# Patient Record
Sex: FEMALE | Race: BLACK OR AFRICAN AMERICAN | Employment: FULL TIME | URBAN - METROPOLITAN AREA
[De-identification: names, ages, dates, MRNs, and addresses within clinical notes are randomized per-mention and may not be internally consistent; named-entity substitution may affect disease eponyms.]

---

## 2017-10-23 ENCOUNTER — OFFICE VISIT (OUTPATIENT)
Dept: FAMILY MEDICINE CLINIC | Facility: CLINIC | Age: 47
End: 2017-10-23

## 2017-10-23 VITALS
BODY MASS INDEX: 36.88 KG/M2 | DIASTOLIC BLOOD PRESSURE: 82 MMHG | WEIGHT: 235 LBS | TEMPERATURE: 98 F | HEIGHT: 67 IN | RESPIRATION RATE: 18 BRPM | HEART RATE: 76 BPM | SYSTOLIC BLOOD PRESSURE: 121 MMHG

## 2017-10-23 DIAGNOSIS — Z00.00 ROUTINE PHYSICAL EXAMINATION: Primary | ICD-10-CM

## 2017-10-23 DIAGNOSIS — R10.13 DYSPEPSIA: ICD-10-CM

## 2017-10-23 DIAGNOSIS — N89.8 VAGINAL ODOR: ICD-10-CM

## 2017-10-23 PROCEDURE — 99386 PREV VISIT NEW AGE 40-64: CPT | Performed by: FAMILY MEDICINE

## 2017-10-23 NOTE — PROGRESS NOTES
HPI:    Patient ID: Viki Otero is a 41year old female. Patient is here for routine physical exam. No acute issues. No significant chronic medical problems. Patient is requesting testing. Diet and exercise have been fair.  Past medical history, family physical examination:  - Exam is unremarkable. Screening tests were discussed, and after discussion, will check lab work as below. Healthy diet, exercise, and weight were discussed. To call if problems and follow up and further management after testing.  Jana

## 2017-10-31 ENCOUNTER — TELEPHONE (OUTPATIENT)
Dept: GASTROENTEROLOGY | Facility: CLINIC | Age: 47
End: 2017-10-31

## 2017-10-31 ENCOUNTER — APPOINTMENT (OUTPATIENT)
Dept: LAB | Facility: HOSPITAL | Age: 47
End: 2017-10-31
Attending: FAMILY MEDICINE
Payer: COMMERCIAL

## 2017-10-31 ENCOUNTER — OFFICE VISIT (OUTPATIENT)
Dept: GASTROENTEROLOGY | Facility: CLINIC | Age: 47
End: 2017-10-31

## 2017-10-31 ENCOUNTER — OFFICE VISIT (OUTPATIENT)
Dept: OBGYN CLINIC | Facility: CLINIC | Age: 47
End: 2017-10-31

## 2017-10-31 VITALS
HEART RATE: 70 BPM | HEIGHT: 69 IN | DIASTOLIC BLOOD PRESSURE: 79 MMHG | WEIGHT: 233.38 LBS | BODY MASS INDEX: 34.57 KG/M2 | SYSTOLIC BLOOD PRESSURE: 114 MMHG

## 2017-10-31 VITALS
DIASTOLIC BLOOD PRESSURE: 78 MMHG | SYSTOLIC BLOOD PRESSURE: 115 MMHG | BODY MASS INDEX: 34.66 KG/M2 | WEIGHT: 234 LBS | HEIGHT: 69 IN | HEART RATE: 91 BPM

## 2017-10-31 DIAGNOSIS — Z01.419 ENCOUNTER FOR GYNECOLOGICAL EXAMINATION WITHOUT ABNORMAL FINDING: Primary | ICD-10-CM

## 2017-10-31 DIAGNOSIS — Z12.31 SCREENING MAMMOGRAM, ENCOUNTER FOR: ICD-10-CM

## 2017-10-31 DIAGNOSIS — Z00.00 ROUTINE PHYSICAL EXAMINATION: ICD-10-CM

## 2017-10-31 DIAGNOSIS — R14.0 BLOATING SYMPTOM: ICD-10-CM

## 2017-10-31 DIAGNOSIS — Z12.4 SCREENING FOR MALIGNANT NEOPLASM OF CERVIX: ICD-10-CM

## 2017-10-31 DIAGNOSIS — R10.9 ABDOMINAL DISCOMFORT: Primary | ICD-10-CM

## 2017-10-31 DIAGNOSIS — Z11.3 SCREENING FOR STD (SEXUALLY TRANSMITTED DISEASE): ICD-10-CM

## 2017-10-31 DIAGNOSIS — N89.8 VAGINAL DISCHARGE: ICD-10-CM

## 2017-10-31 PROCEDURE — 87389 HIV-1 AG W/HIV-1&-2 AB AG IA: CPT

## 2017-10-31 PROCEDURE — 99212 OFFICE O/P EST SF 10 MIN: CPT | Performed by: INTERNAL MEDICINE

## 2017-10-31 PROCEDURE — 80074 ACUTE HEPATITIS PANEL: CPT

## 2017-10-31 PROCEDURE — 84443 ASSAY THYROID STIM HORMONE: CPT

## 2017-10-31 PROCEDURE — 36415 COLL VENOUS BLD VENIPUNCTURE: CPT

## 2017-10-31 PROCEDURE — 85027 COMPLETE CBC AUTOMATED: CPT

## 2017-10-31 PROCEDURE — 99243 OFF/OP CNSLTJ NEW/EST LOW 30: CPT | Performed by: INTERNAL MEDICINE

## 2017-10-31 PROCEDURE — 99213 OFFICE O/P EST LOW 20 MIN: CPT | Performed by: OBSTETRICS & GYNECOLOGY

## 2017-10-31 PROCEDURE — 80061 LIPID PANEL: CPT

## 2017-10-31 PROCEDURE — 80053 COMPREHEN METABOLIC PANEL: CPT

## 2017-10-31 PROCEDURE — 99386 PREV VISIT NEW AGE 40-64: CPT | Performed by: OBSTETRICS & GYNECOLOGY

## 2017-10-31 PROCEDURE — 83036 HEMOGLOBIN GLYCOSYLATED A1C: CPT

## 2017-10-31 PROCEDURE — 86780 TREPONEMA PALLIDUM: CPT

## 2017-10-31 RX ORDER — OMEPRAZOLE 40 MG/1
40 CAPSULE, DELAYED RELEASE ORAL DAILY
Qty: 90 CAPSULE | Refills: 1 | Status: SHIPPED | OUTPATIENT
Start: 2017-10-31

## 2017-10-31 NOTE — TELEPHONE ENCOUNTER
Scheduled for:  EGD  Provider Name: Ash Ivory  Date:  11-22-17  Location:  [facility]  Sedation:  IV sedation   Time:  Pt aware CF will call with arrival time the day prior to procedure  Prep: NPO after midnight, clear liquids only up to 3 hours prior  Meds/Allergies

## 2017-10-31 NOTE — H&P
[address] - Gastroenterology                                                                                                  Clinic History and Physical 9\" (1.753 m), weight 233 lb 6.4 oz (105.9 kg), last menstrual period 10/06/2017.     General:awake, cooperative, no acute distress  HEENT: EOMI, no scleral icterus, MMM; oral pharnyx is without exudates or lesions  Neck: no lymphadenopathy; thyroid is not

## 2017-10-31 NOTE — H&P
HPI:  The patient is57year-old female 51-year-old female who presents for well woman examination. She notes recent experiencing foul-smelling vaginal discharge. She denies any antibiotics or changes in soaps or detergents.   She states [facility].  Skin/Breast:  Denies any breast pain, lumps, or discharge. Neurological:  denies headaches, extremity weakness  Psychiatric: denies depression or anxiety.        10/31/17  1401   BP: 115/78   Pulse: 91       PHYSICAL EXAM:   Constitutional: well de HPV HIGH RISK , THIN PREP COLLECTION; Future  -     HPV HIGH RISK , THIN PREP COLLECTION  -     THINPREP PAP SMEAR B        1. WWE:   1. Reviewed ASCCP guidelines with the patient --Pap updated today  2. Contraception: Using condoms  3.  Breast Health:

## 2017-10-31 NOTE — PATIENT INSTRUCTIONS
1. Schedule upper endoscopy (EGD) with Iv/conscious sedation with Dr. Catrachito Manjarrez    2. Take the omeprazole by mouth prescribed to your pharmacy - best if taken 30 minutes prior to eating each morning    3.  Get the X ray of your abdomen completed in radiolog

## 2017-11-02 ENCOUNTER — TELEPHONE (OUTPATIENT)
Dept: PEDIATRICS CLINIC | Facility: CLINIC | Age: 47
End: 2017-11-02

## 2017-11-02 NOTE — TELEPHONE ENCOUNTER
pt calling for results and wants to  a copy at [facility]   pls call once ready

## 2017-11-02 NOTE — TELEPHONE ENCOUNTER
Per the pt she had testing done on Tuesday, and the pt would like the results. Per the pt a detailed v/m can be left at [phone]. Please advise.

## 2017-11-03 RX ORDER — METRONIDAZOLE 500 MG/1
500 TABLET ORAL 2 TIMES DAILY
Qty: 14 TABLET | Refills: 0 | Status: SHIPPED | OUTPATIENT
Start: 2017-11-03 | End: 2017-11-10

## 2017-11-03 NOTE — TELEPHONE ENCOUNTER
Reviewed with LESLIE in office and pt given choice of po Flagyl or Metrogel vaginally. Pt chose Flagyl, 500 mg BID x 7 day ordered. Pt aware.

## 2017-11-03 NOTE — TELEPHONE ENCOUNTER
Pt calling for lab results. All negative except inconclusive BV. Pt reports an odor and would like to be treated. Will review with LESLIE and call her back. Pt verbalized understanding.

## 2017-11-09 NOTE — TELEPHONE ENCOUNTER
C/O SMALL BUMP ON THE OUTSIDE OF THE VAGINA. DOES NOT HURT BUT THE WHOLE VAGINAL AREA ITCHES. DENIES ANY VAG DISCHARGE. STATES PREVIOUS DISCHARGE HAS RESOLVED. PT HAS 1 DAY LEFT OF FLAGYL.   ASKED HER TO MONITOR AND IF SYMPTOMS DO NOT RESOLVE WITHIN 2-3

## 2017-11-22 ENCOUNTER — TELEPHONE (OUTPATIENT)
Dept: GASTROENTEROLOGY | Facility: CLINIC | Age: 47
End: 2017-11-22

## 2017-11-22 DIAGNOSIS — R19.8 ABNORMAL FINDINGS ON ESOPHAGOGASTRODUODENOSCOPY (EGD): Primary | ICD-10-CM

## 2017-11-22 NOTE — TELEPHONE ENCOUNTER
GI staff:    Please also reschedule her for EGD with MAC at [facility] for indication of abdominal pain.     Thank you    Tiana Marie MD  [facility], [facility] - Gastroenterology  11/22/2017  8:18 AM

## 2017-11-22 NOTE — TELEPHONE ENCOUNTER
Dr Simental Situ, please clarify/revise below--your note says UGI WITH KUB, but the order reads UGI NO KUB? Thanks. Will also forward to GI schedulers to reschedule EGD per your orders below.

## 2017-11-22 NOTE — TELEPHONE ENCOUNTER
I attempted to reach pt, but no answer and no voicemail set up. I called her pharmacy also but this is the only number. I will attempt again later.  Just in case, I have also mailed letter, along with both orders and include message about calling to resche

## 2017-11-22 NOTE — TELEPHONE ENCOUNTER
I had ordered a KUB previously for her separately which she had not completed yet. She should have a KUB.  Thanks    Kelsi Sorensen MD  [facility], [facility] - Gastroenterology  11/22/2017  9:07 AM

## 2017-11-22 NOTE — TELEPHONE ENCOUNTER
I called this patient to schedule her procedure which she was unavailable, I could not leave a VM since it has not been set up yet. Please transfer to Arlen Grant in GI if the patient calls back.  If I am not available please inform the patient of her VM and a a

## 2017-11-22 NOTE — TELEPHONE ENCOUNTER
GI staff:    Patient had attempted EGD in [address] today. Unable to intubate the esophagus. Fair tolerating to conscious sedation, but unable to intubate despite multiple attempts. Will order upper GI study to evaluate esophagus.  Please call her today or F

## 2017-11-22 NOTE — TELEPHONE ENCOUNTER
Pt called back in other 11/22 encounter. I reviewed all below with pt and gave OP registration # to call for both x-rays--I read off both of them to her--SHELIA Smith  is gone for the day, but sending this to reschedule pt. Thanks.

## 2017-12-02 ENCOUNTER — HOSPITAL ENCOUNTER (OUTPATIENT)
Dept: GENERAL RADIOLOGY | Facility: HOSPITAL | Age: 47
Discharge: HOME | End: 2017-12-02
Attending: INTERNAL MEDICINE
Payer: COMMERCIAL

## 2017-12-02 DIAGNOSIS — R19.8 ABNORMAL FINDINGS ON ESOPHAGOGASTRODUODENOSCOPY (EGD): ICD-10-CM

## 2017-12-02 PROCEDURE — 74246 X-RAY XM UPR GI TRC 2CNTRST: CPT | Performed by: INTERNAL MEDICINE

## 2017-12-03 ENCOUNTER — TELEPHONE (OUTPATIENT)
Dept: GASTROENTEROLOGY | Facility: CLINIC | Age: 47
End: 2017-12-03

## 2017-12-04 RX ORDER — POLYETHYLENE GLYCOL 3350, SODIUM CHLORIDE, SODIUM BICARBONATE, POTASSIUM CHLORIDE 420; 11.2; 5.72; 1.48 G/4L; G/4L; G/4L; G/4L
POWDER, FOR SOLUTION ORAL
Qty: 1 BOTTLE | Refills: 0 | Status: SHIPPED | OUTPATIENT
Start: 2017-12-04 | End: 2017-12-18

## 2017-12-04 NOTE — TELEPHONE ENCOUNTER
GI Staff:    Please call Ms. Ashley Grayson and let her know    1. Her Xray shows a significant amount of stool, which is related to constipation and likely contributing to her symptoms we discussed in the office last month.     I'd like her to have a washout of the

## 2017-12-04 NOTE — TELEPHONE ENCOUNTER
Pt contacted--given results and all instructions below which pt wrote down and repeated back . Trilyte sent to pharmacy. Maryellen Hill RN faxed instructions to pharmacy. Pt has no further questions and states understanding.

## 2017-12-04 NOTE — TELEPHONE ENCOUNTER
Washout prep instructions faxed to pt preferred pharmacy at [phone]. Pt is already scheduled for EGD on 1/3/2018.  Fax confirmation received 12/4/17 @ 11:25am.

## 2017-12-12 ENCOUNTER — TELEPHONE (OUTPATIENT)
Dept: GASTROENTEROLOGY | Facility: CLINIC | Age: 47
End: 2017-12-12

## 2017-12-12 NOTE — TELEPHONE ENCOUNTER
Call transferred to RN:    Pt called to check what she should eat for dinner. I reviewed that she should have a light dinner and to avoid stomach upset she should avoid fried, spicy, greasy foods.  She wanted to know if she had to avoid any artifical colors

## 2017-12-12 NOTE — TELEPHONE ENCOUNTER
Pt contacted and she wanted to know if she should continue the washout b/c she has \"not even reached half the prep yet\" and her stool is already liquid yellow.  I reviewed Dr. Lowell Dale washout instructions below and reviewed that the goal is to finish the solut

## 2017-12-18 ENCOUNTER — OFFICE VISIT (OUTPATIENT)
Dept: OBGYN CLINIC | Facility: CLINIC | Age: 47
End: 2017-12-18

## 2017-12-18 VITALS
DIASTOLIC BLOOD PRESSURE: 88 MMHG | HEART RATE: 65 BPM | WEIGHT: 235.19 LBS | BODY MASS INDEX: 35 KG/M2 | SYSTOLIC BLOOD PRESSURE: 127 MMHG

## 2017-12-18 DIAGNOSIS — N89.8 VAGINAL ODOR: Primary | ICD-10-CM

## 2017-12-18 PROCEDURE — 99213 OFFICE O/P EST LOW 20 MIN: CPT | Performed by: OBSTETRICS & GYNECOLOGY

## 2017-12-18 NOTE — H&P
HPI:  The patient is a 26-year-old female who presents complaining of 1 week of malodorous white watery irritating discharge. She denies any recent antibiotic exposure, douching, new sexual partners, or changes in soaps or detergents.   She does report u breast pain, lumps, or discharge. Neurological:  denies headaches, extremity weakness  Psychiatric: denies depression or anxiety.        12/18/17  1146   BP: 127/88   Pulse: 65       PHYSICAL EXAM:   Constitutional: well developed, well nourished  Head/Fa

## 2017-12-20 ENCOUNTER — TELEPHONE (OUTPATIENT)
Dept: OBGYN CLINIC | Facility: CLINIC | Age: 47
End: 2017-12-20

## 2017-12-20 NOTE — TELEPHONE ENCOUNTER
Pt informed that yeast was also negative on vaginal culture. Pt stated that she is still very irritated and \"itchy\" on external vagina. Pt stated the area is especially irritating when wiping. Pt denies redness or swelling to area.  Asked pt if she has us

## 2017-12-20 NOTE — TELEPHONE ENCOUNTER
Advised pt her BV and trich are negative and yeast not yet resulted. She will call back this afternoon.

## 2018-01-03 ENCOUNTER — LAB REQUISITION (OUTPATIENT)
Dept: LAB | Facility: HOSPITAL | Age: 48
End: 2018-01-03
Payer: COMMERCIAL

## 2018-01-03 DIAGNOSIS — Z01.89 ENCOUNTER FOR OTHER SPECIFIED SPECIAL EXAMINATIONS: ICD-10-CM

## 2018-01-03 PROCEDURE — 88305 TISSUE EXAM BY PATHOLOGIST: CPT | Performed by: INTERNAL MEDICINE

## 2018-01-03 PROCEDURE — 88312 SPECIAL STAINS GROUP 1: CPT | Performed by: INTERNAL MEDICINE

## 2018-01-04 DIAGNOSIS — A04.8 HELICOBACTER PYLORI (H. PYLORI) INFECTION: Primary | ICD-10-CM

## 2018-01-04 RX ORDER — OMEPRAZOLE 20 MG/1
20 CAPSULE, DELAYED RELEASE ORAL
Qty: 28 CAPSULE | Refills: 0 | Status: SHIPPED | OUTPATIENT
Start: 2018-01-04 | End: 2018-01-18

## 2018-01-04 RX ORDER — CLARITHROMYCIN 500 MG/1
500 TABLET, COATED ORAL 2 TIMES DAILY
Qty: 28 TABLET | Refills: 0 | Status: SHIPPED | OUTPATIENT
Start: 2018-01-04 | End: 2018-01-18

## 2018-01-04 RX ORDER — AMOXICILLIN 500 MG/1
1000 TABLET, FILM COATED ORAL 2 TIMES DAILY
Qty: 56 TABLET | Refills: 0 | Status: SHIPPED | OUTPATIENT
Start: 2018-01-04 | End: 2018-01-18

## 2018-01-04 RX ORDER — METRONIDAZOLE 500 MG/1
500 TABLET ORAL 2 TIMES DAILY
Qty: 28 TABLET | Refills: 0 | Status: SHIPPED | OUTPATIENT
Start: 2018-01-04 | End: 2018-01-18

## 2018-01-04 NOTE — H&P
GI Staff:    I sent patient a note through my chart that her h.pylori was +    [facility] [place]j Moore,    The biopsies at the time of your EGD (upper endoscopy) showed an infection with the bacteria called Helicobacter pylori.      The Helicobacter pylori bacteria i bacteria, she will need to be off her omerpazole for 2 weeks prior to the test.    Thanks    Mike Wilson MD  [facility], [facility] - Gastroenterology  1/4/2018  5:33 PM

## 2018-01-05 ENCOUNTER — TELEPHONE (OUTPATIENT)
Dept: GASTROENTEROLOGY | Facility: CLINIC | Age: 48
End: 2018-01-05

## 2018-01-05 NOTE — TELEPHONE ENCOUNTER
Dr. Bryan Davis    Pt was contacted and discussed the following w/ patient regarding h. Pylori treatment:    I reviewed that h.pylori has unknown etiology, possibly contaminated water, and may cause symptoms in some patients and not others.  I explained that it

## 2018-01-05 NOTE — TELEPHONE ENCOUNTER
Per Dr. Tobin aCin:    GI Staff:    I sent patient a note through my chart that her h.pylori was +    [facility] Ms. Tessy Zhang,    The biopsies at the time of your EGD (upper endoscopy) showed an infection with the bacteria called Helicobacter pylori.      The Helicobacter py clearance of the bacteria, she will need to be off her omerpazole for 2 weeks prior to the test.    Thanks    Ang Aguilar MD  [facility], [facility] - Gastroenterology  1/4/2018  5:33 PM

## 2018-01-10 ENCOUNTER — TELEPHONE (OUTPATIENT)
Dept: GASTROENTEROLOGY | Facility: CLINIC | Age: 48
End: 2018-01-10

## 2018-01-10 NOTE — TELEPHONE ENCOUNTER
Dr. Lilia Cardenas, please see message below. Thank you.     Spoke pt states misread directions on rx and instead of taking Amoxicillin 500 mg as written which was 2 tablets (1000 mg total) by mouth two times daily was taking only 1 tablet (500 mg total) twice a

## 2018-01-10 NOTE — TELEPHONE ENCOUNTER
Pt states that she has been taking 2 500mg tabs (one in AM and one in PM) of amoxicillin but she was supposed to take 2 500mg tabs twice daily. She misread bottle. Please call.         Current Outpatient Prescriptions:   •  amoxicillin 500 MG Oral Tab, Sergio Summers

## 2018-01-10 NOTE — TELEPHONE ENCOUNTER
Thank you for the update, but recommend continuing as per the initial instructions. No changes.     Thanks    Shay Cassidy MD  [facility], [facility] - Gastroenterology  1/10/2018  4:08 PM

## 2018-01-10 NOTE — TELEPHONE ENCOUNTER
Spoke to pt. Relayed Dr. Amanda Mcneill message as shown below. Pt verbalized understanding of whole message and had no further questions at this time.

## 2018-01-15 ENCOUNTER — TELEPHONE (OUTPATIENT)
Dept: PEDIATRICS CLINIC | Facility: CLINIC | Age: 48
End: 2018-01-15

## 2018-01-15 NOTE — TELEPHONE ENCOUNTER
Pt calling to report that she was prescribed antibiotics from her GI doctor and is now experiencing vaginal itching and wants to know what she can use. Pt denies vaginal d/c or odor. Pt informed she can use OTC Monistat 7.  Pt advised to call if sx's do not

## 2018-01-20 ENCOUNTER — TELEPHONE (OUTPATIENT)
Dept: OBGYN CLINIC | Facility: CLINIC | Age: 48
End: 2018-01-20

## 2018-01-20 NOTE — TELEPHONE ENCOUNTER
Pt called earlier in the week and complain symptoms of a yeast infection. Pt states that she has been using Monistat 7 and has 3 days left.   Pt states that her symptoms are better, but feels that she could benefit from getting a pill for the yeast infecti

## 2018-01-25 ENCOUNTER — TELEPHONE (OUTPATIENT)
Dept: GASTROENTEROLOGY | Facility: CLINIC | Age: 48
End: 2018-01-25

## 2018-02-02 NOTE — TELEPHONE ENCOUNTER
Pt contacted and she states that she forgot why she called the office. She states that she did a fruit/vegetable smoothie cleanse for 10 days and feels \"awesome. \"    She states she was reading about high colonics and wants to know if Dr. Pearl Harris would recomme

## 2018-02-05 ENCOUNTER — PATIENT MESSAGE (OUTPATIENT)
Dept: GASTROENTEROLOGY | Facility: CLINIC | Age: 48
End: 2018-02-05

## 2018-02-05 NOTE — TELEPHONE ENCOUNTER
Per pt preference, my chart message sent to pt below:     \"Sid,     Based on your questions regarding colonics, here is Dr. Ferdinand Rubio recommendations for your review:    \"I do not typically recommend these as they are not standardized, have risks,

## 2018-02-05 NOTE — TELEPHONE ENCOUNTER
I do not typically recommend these as they are not standardized, have risks, and not clear benefit, and have not been studied in the literature well.     Leatha Giraldo MD  [address] - Gastroenterology  2/4/2018  8:41 PM

## 2018-02-07 NOTE — TELEPHONE ENCOUNTER
From: Francisco J Hunter  Sent: 2/6/2018 10:29 PM CST  To: [facility] Clinical Staff  Subject: RE:Follow up on Colonics Questions    Thank you for the information.  I will not follow through!  ----- Message -----  From: Avis BRAMBILA  Sent: 2/5/2018 11:45 AM CST  To: Dim

## 2018-03-15 ENCOUNTER — TELEPHONE (OUTPATIENT)
Dept: GASTROENTEROLOGY | Facility: CLINIC | Age: 48
End: 2018-03-15

## 2018-03-15 NOTE — TELEPHONE ENCOUNTER
Pt calling to advise that she finished her medication, and asking if she is due for her f/up appt. Pls call BJ:[phone] or luke#[phone],thanks.

## 2018-03-15 NOTE — TELEPHONE ENCOUNTER
Sent pt instructions via [employer]: \"Hi Sid,     I hope you are doing well! I am sorry that we keep playing phone tag. I received your message today and unless you are having any symptoms at this time, we can hold off on a follow-up appointment.

## 2018-03-15 NOTE — TELEPHONE ENCOUNTER
LMTCB w/ family member. Tried alternative (no VM is set up and no answer) CSS-TRANSFER TO RN! Pt needs to complete h.pylori repeat stool test at this time, CANNOT be on any PPI for 2 wks prior to restesting.

## 2018-03-16 NOTE — TELEPHONE ENCOUNTER
Pt returned our call, I read Otto Palma and  [facility] message to the pt,  I asked to confimed that she has not had any PPI's  Pt confirmed, she has NOT taken any PPIs  I asked pt if she had any questions, she has not.  Pt verbalized understanding about the repeat H Py

## 2018-03-22 ENCOUNTER — TELEPHONE (OUTPATIENT)
Dept: GASTROENTEROLOGY | Facility: CLINIC | Age: 48
End: 2018-03-22

## 2018-03-22 NOTE — TELEPHONE ENCOUNTER
Pt calling to gabriela ruiz RN indicates she was at her neighborhood clinic and tested pos for STEFANI Roth pls call at:[phone],thanks.

## 2019-05-11 ENCOUNTER — WALK IN (OUTPATIENT)
Dept: URGENT CARE | Age: 49
End: 2019-05-11

## 2019-05-11 DIAGNOSIS — Z11.1 SCREENING FOR TUBERCULOSIS: Primary | ICD-10-CM

## 2019-05-11 PROCEDURE — 86580 TB INTRADERMAL TEST: CPT | Performed by: NURSE PRACTITIONER

## 2019-05-13 ENCOUNTER — WALK IN (OUTPATIENT)
Dept: URGENT CARE | Age: 49
End: 2019-05-13

## 2019-05-13 ENCOUNTER — TELEPHONE (OUTPATIENT)
Dept: SCHEDULING | Age: 49
End: 2019-05-13

## 2019-05-13 DIAGNOSIS — Z11.1 ENCOUNTER FOR PPD SKIN TEST READING: Primary | ICD-10-CM

## 2019-05-13 LAB
INDURATION: 0 MM (ref 0–?)
SKIN TEST RESULT: NEGATIVE

## 2019-05-13 PROCEDURE — X1094 NO CHARGE VISIT: HCPCS | Performed by: NURSE PRACTITIONER

## 2022-02-22 PROBLEM — E11.9 DIABETES MELLITUS (HCC): Status: ACTIVE | Noted: 2022-02-22

## 2023-11-15 NOTE — TELEPHONE ENCOUNTER
Scheduled for: EGD 42160   Provider Name: Dr. Russ Phelps  Date:  1/3/18  Location:  [facility]  Sedation:  MAC  Time:  5588 (pt is aware that [facility] will call the day before to confirm arrival time)   Prep: NPO after midnight unless she is scheduled in the afternoon, no

## (undated) NOTE — LETTER
11/22/2017              Alejandro Lutz        [address]  [facility] [address]         Dear Lv Andujar,    This letter is to inform you that our office has made several attempts to reach you by phone without success, as you do not have voice mail se